# Patient Record
Sex: MALE | Race: BLACK OR AFRICAN AMERICAN | NOT HISPANIC OR LATINO | Employment: FULL TIME | URBAN - METROPOLITAN AREA
[De-identification: names, ages, dates, MRNs, and addresses within clinical notes are randomized per-mention and may not be internally consistent; named-entity substitution may affect disease eponyms.]

---

## 2023-10-19 ENCOUNTER — OFFICE VISIT (OUTPATIENT)
Dept: ORTHOPEDIC SURGERY | Facility: CLINIC | Age: 18
End: 2023-10-19
Payer: COMMERCIAL

## 2023-10-19 VITALS — WEIGHT: 249.3 LBS | TEMPERATURE: 97.3 F | HEIGHT: 73 IN | BODY MASS INDEX: 33.04 KG/M2

## 2023-10-19 DIAGNOSIS — M25.561 RIGHT KNEE PAIN, UNSPECIFIED CHRONICITY: Primary | ICD-10-CM

## 2023-10-19 DIAGNOSIS — M25.551 RIGHT HIP PAIN: ICD-10-CM

## 2023-10-19 DIAGNOSIS — M25.552 LEFT HIP PAIN: ICD-10-CM

## 2023-10-19 PROCEDURE — 99204 OFFICE O/P NEW MOD 45 MIN: CPT | Performed by: ORTHOPAEDIC SURGERY

## 2023-10-19 RX ORDER — IBUPROFEN 600 MG/1
600 TABLET ORAL EVERY 6 HOURS PRN
COMMUNITY

## 2023-10-19 RX ORDER — FEXOFENADINE HCL 60 MG/1
60 TABLET, FILM COATED ORAL DAILY
COMMUNITY

## 2023-10-19 RX ORDER — TOPIRAMATE 25 MG/1
25 TABLET ORAL 2 TIMES DAILY
COMMUNITY

## 2023-10-19 RX ORDER — FLUOXETINE HYDROCHLORIDE 20 MG/1
40 CAPSULE ORAL DAILY
COMMUNITY

## 2023-10-19 RX ORDER — MONTELUKAST SODIUM 5 MG/1
5 TABLET, CHEWABLE ORAL NIGHTLY
COMMUNITY

## 2023-10-19 NOTE — PROGRESS NOTES
Patient: Edwin Resendez  YOB: 2005 18 y.o. male  Medical Record Number: [id number]    Chief Complaints:   Chief Complaint   Patient presents with    Right Knee - Initial Evaluation    Right Hip - Initial Evaluation       History of Present Illness:Edwin Resendez is a 18 y.o. male who presents with complaints of left hip pain it is intermittent and he has episodes where he feels his leg will catch and give out.  There are times where he will go down onto his hands and knees as the leg gives out.  This has been ongoing for about 6 months and he had episodes every few weeks and his last episode was about a month ago.  He had no previous hip problems however he was diagnosed with hip dysplasia on the right hip as a child and was treated with serial casting.  Also having mild soreness which will occasionally radiate to his knees    Allergies:   Allergies   Allergen Reactions    Amoxicillin Hives    Lactose Intolerance (Gi) Other (See Comments)          Magnesium Citrate Hives       Medications:   Current Outpatient Medications   Medication Sig Dispense Refill    fexofenadine (ALLEGRA) 60 MG tablet Take 1 tablet by mouth Daily.      FLUoxetine (PROzac) 20 MG capsule Take 2 capsules by mouth Daily.      ibuprofen (ADVIL,MOTRIN) 600 MG tablet Take 1 tablet by mouth Every 6 (Six) Hours As Needed for Mild Pain.      montelukast (SINGULAIR) 5 MG chewable tablet Chew 1 tablet Every Night.      topiramate (TOPAMAX) 25 MG tablet Take 1 tablet by mouth 2 (Two) Times a Day.      Ubrogepant (UBRELVY PO) Take  by mouth.       No current facility-administered medications for this visit.         The following portions of the patient's history were reviewed and updated as appropriate: allergies, current medications, past family history, past medical history, past social history, past surgical history and problem list.    Review of Systems:   Pertinent positives/negative listed in HPI above    Physical  "Exam:   Vitals:    10/19/23 1554   Temp: 97.3 °F (36.3 °C)   Weight: 113 kg (249 lb 4.8 oz)   Height: 185.4 cm (73\")   PainSc:   6   PainLoc: Hip       General: A and O x 3, ASA, NAD      Left hip is slightly stiffer than the right he has mild apprehension with flexion abduction and internal rotation he can perform a straight leg raise without lag he walks with a nonantalgic gait        Radiology:  Xrays 2views right hip (AP bilateral hips, and lateral of the hip) ordered and reviewed for evaluation of hip pain  demonstrating  minimal arthritic findings of both hips possibly some very mild superior medial joint space narrowing.  Also perhaps some very mild dysplasia.  There are no previous films or comparison    X-rays 3 views of the right knee were ordered and reviewed which show no abnormality.  No previous films or comparison    Assessment/Plan: History of right hip dysplasia mild arthritis.    Left hip pain with mechanical symptoms and giving way concerned he could have a labral tear I am going to get an MRI arthrogram of the hip and I will call him with results      Diagnoses and all orders for this visit:    1. Right knee pain, unspecified chronicity (Primary)  -     XR Knee 3 View Right    2. Right hip pain  -     XR Hip With or Without Pelvis 2 - 3 View Right        Gurvinder Pelayo MD  10/19/2023  "

## 2023-11-01 ENCOUNTER — HOSPITAL ENCOUNTER (OUTPATIENT)
Dept: MRI IMAGING | Facility: HOSPITAL | Age: 18
Discharge: HOME | End: 2023-11-01
Payer: COMMERCIAL

## 2023-11-01 ENCOUNTER — HOSPITAL ENCOUNTER (OUTPATIENT)
Dept: GENERAL RADIOLOGY | Facility: HOSPITAL | Age: 18
Discharge: HOME | End: 2023-11-01
Payer: COMMERCIAL

## 2023-11-01 DIAGNOSIS — M25.552 LEFT HIP PAIN: ICD-10-CM

## 2023-11-01 PROCEDURE — 73722 MRI JOINT OF LWR EXTR W/DYE: CPT

## 2023-11-01 PROCEDURE — 25510000001 IOPAMIDOL 61 % SOLUTION: Performed by: ORTHOPAEDIC SURGERY

## 2023-11-01 PROCEDURE — 0 GADOBENATE DIMEGLUMINE 529 MG/ML SOLUTION: Performed by: ORTHOPAEDIC SURGERY

## 2023-11-01 PROCEDURE — A9577 INJ MULTIHANCE: HCPCS | Performed by: ORTHOPAEDIC SURGERY

## 2023-11-01 PROCEDURE — 25010000002 LIDOCAINE 1 % SOLUTION: Performed by: ORTHOPAEDIC SURGERY

## 2023-11-01 PROCEDURE — 77002 NEEDLE LOCALIZATION BY XRAY: CPT

## 2023-11-01 RX ORDER — LIDOCAINE HYDROCHLORIDE 10 MG/ML
10 INJECTION, SOLUTION INFILTRATION; PERINEURAL
Status: COMPLETED | OUTPATIENT
Start: 2023-11-01 | End: 2023-11-01

## 2023-11-01 RX ADMIN — GADOBENATE DIMEGLUMINE 0.01 ML: 529 INJECTION, SOLUTION INTRAVENOUS at 14:17

## 2023-11-01 RX ADMIN — IOPAMIDOL 4 ML: 612 INJECTION, SOLUTION INTRAVENOUS at 13:59

## 2023-11-01 RX ADMIN — LIDOCAINE HYDROCHLORIDE 4 ML: 10 INJECTION, SOLUTION INFILTRATION; PERINEURAL at 13:59

## 2023-11-02 NOTE — PROGRESS NOTES
I called and spoke to this patient in regards to his MRI results.  I have reviewed these results and discussed treatment recommendations with Dr. Pelayo.  Patient's MRI shows no obvious abnormality noted.  Dr. Pelayo feels like the patient could benefit from some outpatient physical therapy.  Patient states that he would like to see if more time would make his symptoms improve and then if not he will call back and schedule therapy at that time.  For he can follow back up with us on an as-needed basis.

## 2024-01-23 ENCOUNTER — OFFICE VISIT (OUTPATIENT)
Dept: ORTHOPEDIC SURGERY | Facility: CLINIC | Age: 19
End: 2024-01-23
Payer: COMMERCIAL

## 2024-01-23 VITALS — BODY MASS INDEX: 34.96 KG/M2 | TEMPERATURE: 98.7 F | HEIGHT: 73 IN | WEIGHT: 263.8 LBS

## 2024-01-23 DIAGNOSIS — R52 PAIN: Primary | ICD-10-CM

## 2024-01-23 DIAGNOSIS — M25.562 ACUTE PAIN OF LEFT KNEE: ICD-10-CM

## 2024-01-23 PROCEDURE — 73562 X-RAY EXAM OF KNEE 3: CPT | Performed by: NURSE PRACTITIONER

## 2024-01-23 PROCEDURE — 99214 OFFICE O/P EST MOD 30 MIN: CPT | Performed by: NURSE PRACTITIONER

## 2024-01-23 PROCEDURE — 1160F RVW MEDS BY RX/DR IN RCRD: CPT | Performed by: NURSE PRACTITIONER

## 2024-01-23 PROCEDURE — 1159F MED LIST DOCD IN RCRD: CPT | Performed by: NURSE PRACTITIONER

## 2024-01-23 NOTE — PROGRESS NOTES
"Patient: Edwin Resendez  YOB: 2005 18 y.o. male  Medical Record Number: [id number]    Chief Complaints:   Chief Complaint   Patient presents with    Left Knee - Initial Evaluation, Pain       History of Present Illness:Edwin Resendez is a 18 y.o. male who presents as a new patient both myself as well as to the practice with complaints of left knee pain.  Patient reports that it started 1 month ago, acute onset, he has had multiple episodes of the knee giving out and feeling unstable.  Patient has had to use a knee sleeve at work because he is so worried that he might fall.  He reports that it feels as though there is\" a pressure\" within the knee and the pain is severe at times    Allergies:   Allergies   Allergen Reactions    Amoxicillin Hives    Lactose Intolerance (Gi) Other (See Comments)          Magnesium Citrate Hives       Medications:   Current Outpatient Medications   Medication Sig Dispense Refill    fexofenadine (ALLEGRA) 60 MG tablet Take 1 tablet by mouth Daily.      FLUoxetine (PROzac) 20 MG capsule Take 2 capsules by mouth Daily.      ibuprofen (ADVIL,MOTRIN) 600 MG tablet Take 1 tablet by mouth Every 6 (Six) Hours As Needed for Mild Pain.      montelukast (SINGULAIR) 5 MG chewable tablet Chew 1 tablet Every Night.      Ubrogepant (UBRELVY PO) Take  by mouth.      topiramate (TOPAMAX) 25 MG tablet Take 1 tablet by mouth 2 (Two) Times a Day. (Patient not taking: Reported on 1/23/2024)       No current facility-administered medications for this visit.         The following portions of the patient's history were reviewed and updated as appropriate: allergies, current medications, past family history, past medical history, past social history, past surgical history and problem list.    Review of Systems:   14 point review of systems was performed. All systems negative except pertinent positives/negatives listed in HPI above    Physical Exam:   Vitals:    01/23/24 1515 " "  Temp: 98.7 °F (37.1 °C)   TempSrc: Temporal   Weight: 120 kg (263 lb 12.8 oz)   Height: 185.4 cm (73\")   PainSc:   3   PainLoc: Knee       General: A and O x 3, ASA, NAD   Skin clear no unusual lesions noted  Left knee patient has trace amount of effusion noted with 116 degrees flexion neutral in extension with a positive Bassem negative Lockman calf soft and nontender       Radiology:  Xrays 3views (ap,lateral, sunrise) 3 views of left knee were ordered and reviewed today secondary to pain and were normal no comparative views available    Assessment/Plan: Severe left knee pain with mechanical symptoms    Patient and I discussed options, he has been taking ibuprofen for the last several weeks and that has not helped, he has also tried activity modification yet his symptoms persist, obvious concern would be a meniscus tear, recommending an MRI of the left knee to further evaluate and once we get the results back we will contact patient regarding treatment options      Darya Rick, APRN  1/23/2024  "

## 2024-02-23 ENCOUNTER — HOSPITAL ENCOUNTER (OUTPATIENT)
Dept: MRI IMAGING | Facility: HOSPITAL | Age: 19
Discharge: HOME | End: 2024-02-23
Payer: COMMERCIAL

## 2024-02-23 DIAGNOSIS — M25.562 ACUTE PAIN OF LEFT KNEE: ICD-10-CM

## 2024-02-23 PROCEDURE — 73721 MRI JNT OF LWR EXTRE W/O DYE: CPT

## 2024-02-26 ENCOUNTER — TELEPHONE (OUTPATIENT)
Dept: ORTHOPEDIC SURGERY | Facility: CLINIC | Age: 19
End: 2024-02-26
Payer: COMMERCIAL

## 2024-02-26 DIAGNOSIS — M25.562 CHRONIC PAIN OF LEFT KNEE: Primary | ICD-10-CM

## 2024-02-26 DIAGNOSIS — G89.29 CHRONIC PAIN OF LEFT KNEE: Primary | ICD-10-CM

## 2024-02-26 NOTE — TELEPHONE ENCOUNTER
----- Message from SAMIRA Mariee sent at 2/26/2024 10:37 AM EST -----  Please let patient know that the MRI of his left knee shows some chronic patellar tendinitis, no tears.  Would recommend physical therapy and scheduled over-the-counter anti-inflammatory.

## 2024-02-26 NOTE — TELEPHONE ENCOUNTER
Spoke with patient, relayed to him results of left knee MRI as well as plan of care. Patient verbalized understanding of results and is agreeable to plan. Orders placed for physical therapy.

## 2024-03-11 ENCOUNTER — OFFICE VISIT (OUTPATIENT)
Dept: SURGERY | Facility: CLINIC | Age: 19
End: 2024-03-11
Payer: COMMERCIAL

## 2024-03-11 ENCOUNTER — PREP FOR SURGERY (OUTPATIENT)
Dept: OTHER | Facility: HOSPITAL | Age: 19
End: 2024-03-11
Payer: COMMERCIAL

## 2024-03-11 VITALS
HEIGHT: 73 IN | SYSTOLIC BLOOD PRESSURE: 122 MMHG | BODY MASS INDEX: 34.85 KG/M2 | WEIGHT: 263 LBS | DIASTOLIC BLOOD PRESSURE: 78 MMHG

## 2024-03-11 DIAGNOSIS — L05.91 PILONIDAL CYST: Primary | ICD-10-CM

## 2024-03-11 DIAGNOSIS — L05.01 PILONIDAL CYST WITH ABSCESS: Primary | ICD-10-CM

## 2024-03-11 RX ORDER — SODIUM CHLORIDE 9 MG/ML
40 INJECTION, SOLUTION INTRAVENOUS AS NEEDED
OUTPATIENT
Start: 2024-05-09

## 2024-03-11 RX ORDER — METRONIDAZOLE 500 MG/100ML
500 INJECTION, SOLUTION INTRAVENOUS ONCE
OUTPATIENT
Start: 2024-05-09 | End: 2024-03-11

## 2024-03-11 RX ORDER — SULFAMETHOXAZOLE AND TRIMETHOPRIM 800; 160 MG/1; MG/1
1 TABLET ORAL 2 TIMES DAILY
Qty: 14 TABLET | Refills: 0 | Status: SHIPPED | OUTPATIENT
Start: 2024-03-11

## 2024-03-11 RX ORDER — SODIUM CHLORIDE 0.9 % (FLUSH) 0.9 %
10 SYRINGE (ML) INJECTION EVERY 12 HOURS SCHEDULED
OUTPATIENT
Start: 2024-05-09

## 2024-03-11 RX ORDER — SODIUM CHLORIDE 0.9 % (FLUSH) 0.9 %
10 SYRINGE (ML) INJECTION AS NEEDED
OUTPATIENT
Start: 2024-05-09

## 2024-03-11 NOTE — H&P (VIEW-ONLY)
General Surgery Initial Office Visit    Referring Provider: Dora Walker MD    Chief Complaint:    Pilonidal cyst    History of Present Illness:    Edwin Ingram is a 18 y.o. male with history of a pilonidal cyst.  He states it became swollen and infected in February.  He was seen at [facility] where it was incised and drained.  It improved after that, but recently it has become more swollen and feels like it is infected again.    His mother reports she also had a pilonidal cyst that required excision and packing.  His sister also has a pilonidal cyst.     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  Denies tobacco use  Denies alcohol use    Allergies:   Allergies   Allergen Reactions    Amoxicillin Hives    Lactose Intolerance (Gi) Other (See Comments)          Magnesium Citrate Hives       Medications:     Current Outpatient Medications:     fexofenadine (ALLEGRA) 60 MG tablet, Take 1 tablet by mouth Daily., Disp: , Rfl:     FLUoxetine (PROzac) 20 MG capsule, Take 2 capsules by mouth Daily., Disp: , Rfl:     ibuprofen (ADVIL,MOTRIN) 600 MG tablet, Take 1 tablet by mouth Every 6 (Six) Hours As Needed for Mild Pain., Disp: , Rfl:     montelukast (SINGULAIR) 5 MG chewable tablet, Chew 1 tablet Every Night., Disp: , Rfl:     topiramate (TOPAMAX) 25 MG tablet, Take 1 tablet by mouth 2 (Two) Times a Day., Disp: , Rfl:     Ubrogepant (UBRELVY PO), Take  by mouth., Disp: , Rfl:     sulfamethoxazole-trimethoprim (Bactrim DS) 800-160 MG per tablet, Take 1 tablet by mouth 2 (Two) Times a Day., Disp: 14 tablet, Rfl: 0    Radiology/Endoscopy:    No pertinent imaging    Labs:     No recent labs    Review of Systems:   Influenza-like illness: no fever, no  cough, no  sore throat, no  body aches, no loss of sense of taste or smell, no known exposure to person with Covid-19.  Constitutional: Negative for fevers or chills  HENT: Negative for hearing loss or runny nose  Eyes: Negative for vision changes or scleral icterus  Respiratory: Negative for cough or shortness of breath  Cardiovascular: Negative for chest pain or heart palpitations  Gastrointestinal:  Negative for abdominal pain, nausea, vomiting, constipation, melena, or hematochezia  Genitourinary: Negative for hematuria or dysuria  Musculoskeletal: Negative for joint swelling or gait instability  Neurologic: Negative for tremors or seizures  Psychiatric: Negative for suicidal ideations or depression  All other systems reviewed and negative    Physical Exam:   Body mass index is 34.7 kg/m².  Constitutional: Well-developed well-nourished, no acute distress  Eyes: Conjunctiva normal, sclera nonicteric  ENMT: Hearing grossly normal, oral mucosa moist  Neck: Supple, trachea midline  Respiratory: No increased work of breathing, normal inspiratory effort  Cardiovascular: Regular rate, no peripheral edema, no jugular venous distention  Gastrointestinal: Soft, nontender, wound superior to the gluteal cleft there is an area of fluctuance that is tender to palpation consistent with infected pilonidal cyst  Skin:  Warm, dry, no rash on visualized skin surfaces  Musculoskeletal: Symmetric strength, normal gait  Psychiatric: Alert and oriented ×3, normal affect     Procedures:  Incision & Drainage infected Pilonidal cyst    Date/Time: 3/11/2024 3:01 PM    Performed by: Shoshana Mendieta MD  Authorized by: Shoshana Mendeita MD  Consent: Verbal consent obtained.  Risks and benefits: risks, benefits and alternatives were discussed  Consent given by: patient  Patient understanding: patient states understanding of the procedure being performed  Patient  "identity confirmed: verbally with patient  Time out: Immediately prior to procedure a \"time out\" was called to verify the correct patient, procedure, equipment, support staff and site/side marked as required.  Type: pilonidal cyst  Anesthesia: local infiltration    Anesthesia:  Local Anesthetic: lidocaine 1% with epinephrine  Anesthetic total: 10 mL  Scalpel size: 11  Incision type: elliptical  Drainage: purulent  Wound treatment: wound left open  Packing material: 1/2 in iodoform gauze  Patient tolerance: patient tolerated the procedure well with no immediate complications        Assessment/Plan:  Pilonidal cyst with abscess    Patient consented to I&D today. See procedure note above. Instructed patient and his mother on packing the wound.    I have also sent an additional prescription for Bactrim DS to help clear the infection.    I offered the patient a surgical excision of the pilonidal cyst.  I explained the procedure in detail to the patient and his mother.  I explained that if there was no sign of infection at the time of surgery I would close the skin primarily, but there is still a significant risk of the skin opening which would require wet-to-dry dressings until it heals.  If there is any evidence of ongoing infection at the time of surgery, I will just leave it open to heal by secondary intention with wet-to-dry dressings.  I explained that wound VAC is typically reserved for wounds that failed to heal with wet-to-dry dressings. All risks (including bleeding, infection, damage to surrounding structures, recurrence of pilonidal cyst), benefits, and alternatives were explained to the patient who agreed and wished to proceed.      ZACH NEWTON M.D.  General, Robotic, and Endoscopic Surgery  [facility] [address], [address]  [place], [place], [address]  P: [phone]  F: [phone]     "

## 2024-03-11 NOTE — PROGRESS NOTES
General Surgery Initial Office Visit    Referring Provider: Dora Walker MD    Chief Complaint:    Pilonidal cyst    History of Present Illness:    Edwin Ingram is a 18 y.o. male with history of a pilonidal cyst.  He states it became swollen and infected in February.  He was seen at [facility] where it was incised and drained.  It improved after that, but recently it has become more swollen and feels like it is infected again.    His mother reports she also had a pilonidal cyst that required excision and packing.  His sister also has a pilonidal cyst.     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  Denies tobacco use  Denies alcohol use    Allergies:   Allergies   Allergen Reactions    Amoxicillin Hives    Lactose Intolerance (Gi) Other (See Comments)          Magnesium Citrate Hives       Medications:     Current Outpatient Medications:     fexofenadine (ALLEGRA) 60 MG tablet, Take 1 tablet by mouth Daily., Disp: , Rfl:     FLUoxetine (PROzac) 20 MG capsule, Take 2 capsules by mouth Daily., Disp: , Rfl:     ibuprofen (ADVIL,MOTRIN) 600 MG tablet, Take 1 tablet by mouth Every 6 (Six) Hours As Needed for Mild Pain., Disp: , Rfl:     montelukast (SINGULAIR) 5 MG chewable tablet, Chew 1 tablet Every Night., Disp: , Rfl:     topiramate (TOPAMAX) 25 MG tablet, Take 1 tablet by mouth 2 (Two) Times a Day., Disp: , Rfl:     Ubrogepant (UBRELVY PO), Take  by mouth., Disp: , Rfl:     sulfamethoxazole-trimethoprim (Bactrim DS) 800-160 MG per tablet, Take 1 tablet by mouth 2 (Two) Times a Day., Disp: 14 tablet, Rfl: 0    Radiology/Endoscopy:    No pertinent imaging    Labs:     No recent labs    Review of Systems:   Influenza-like illness: no fever, no  cough, no  sore throat, no  body aches, no loss of sense of taste or smell, no known exposure to person with Covid-19.  Constitutional: Negative for fevers or chills  HENT: Negative for hearing loss or runny nose  Eyes: Negative for vision changes or scleral icterus  Respiratory: Negative for cough or shortness of breath  Cardiovascular: Negative for chest pain or heart palpitations  Gastrointestinal:  Negative for abdominal pain, nausea, vomiting, constipation, melena, or hematochezia  Genitourinary: Negative for hematuria or dysuria  Musculoskeletal: Negative for joint swelling or gait instability  Neurologic: Negative for tremors or seizures  Psychiatric: Negative for suicidal ideations or depression  All other systems reviewed and negative    Physical Exam:   Body mass index is 34.7 kg/m².  Constitutional: Well-developed well-nourished, no acute distress  Eyes: Conjunctiva normal, sclera nonicteric  ENMT: Hearing grossly normal, oral mucosa moist  Neck: Supple, trachea midline  Respiratory: No increased work of breathing, normal inspiratory effort  Cardiovascular: Regular rate, no peripheral edema, no jugular venous distention  Gastrointestinal: Soft, nontender, wound superior to the gluteal cleft there is an area of fluctuance that is tender to palpation consistent with infected pilonidal cyst  Skin:  Warm, dry, no rash on visualized skin surfaces  Musculoskeletal: Symmetric strength, normal gait  Psychiatric: Alert and oriented ×3, normal affect     Procedures:  Incision & Drainage infected Pilonidal cyst    Date/Time: 3/11/2024 3:01 PM    Performed by: Shoshana Mendieta MD  Authorized by: Shoshana Mendieta MD  Consent: Verbal consent obtained.  Risks and benefits: risks, benefits and alternatives were discussed  Consent given by: patient  Patient understanding: patient states understanding of the procedure being performed  Patient  "identity confirmed: verbally with patient  Time out: Immediately prior to procedure a \"time out\" was called to verify the correct patient, procedure, equipment, support staff and site/side marked as required.  Type: pilonidal cyst  Anesthesia: local infiltration    Anesthesia:  Local Anesthetic: lidocaine 1% with epinephrine  Anesthetic total: 10 mL  Scalpel size: 11  Incision type: elliptical  Drainage: purulent  Wound treatment: wound left open  Packing material: 1/2 in iodoform gauze  Patient tolerance: patient tolerated the procedure well with no immediate complications        Assessment/Plan:  Pilonidal cyst with abscess    Patient consented to I&D today. See procedure note above. Instructed patient and his mother on packing the wound.    I have also sent an additional prescription for Bactrim DS to help clear the infection.    I offered the patient a surgical excision of the pilonidal cyst.  I explained the procedure in detail to the patient and his mother.  I explained that if there was no sign of infection at the time of surgery I would close the skin primarily, but there is still a significant risk of the skin opening which would require wet-to-dry dressings until it heals.  If there is any evidence of ongoing infection at the time of surgery, I will just leave it open to heal by secondary intention with wet-to-dry dressings.  I explained that wound VAC is typically reserved for wounds that failed to heal with wet-to-dry dressings. All risks (including bleeding, infection, damage to surrounding structures, recurrence of pilonidal cyst), benefits, and alternatives were explained to the patient who agreed and wished to proceed.      ZACH NEWTON M.D.  General, Robotic, and Endoscopic Surgery  [facility] [address], [address]  [place], [place], [address]  P: [phone]  F: [phone]     "

## 2024-03-12 PROBLEM — L05.91 PILONIDAL CYST: Status: ACTIVE | Noted: 2024-03-11

## 2024-03-25 ENCOUNTER — TELEPHONE (OUTPATIENT)
Dept: SURGERY | Facility: CLINIC | Age: 19
End: 2024-03-25

## 2024-03-25 NOTE — TELEPHONE ENCOUNTER
Caller: JACKY OLIVER    Relationship: MOTHER    Best call back number: [phone]     What is the best time to reach you: TODAY OR MORNINGS    Who are you requesting to speak with (clinical staff, provider,  specific staff member): [facility]    Do you know the name of the person who called:     What was the call regarding: PT'S CYST IS STARTING TO ACT UP AGAIN AND THEY ARE WANTING TO SEE IF SURGERY CAN BE MOVED UP, POSSIBLY    Is it okay if the provider responds through mySupermarkethart: NO, PLEASE CALL - OKAY TO LEAVE DETAILED VM

## 2024-03-26 ENCOUNTER — TELEPHONE (OUTPATIENT)
Dept: SURGERY | Facility: CLINIC | Age: 19
End: 2024-03-26
Payer: COMMERCIAL

## 2024-03-26 RX ORDER — SULFAMETHOXAZOLE AND TRIMETHOPRIM 800; 160 MG/1; MG/1
1 TABLET ORAL 2 TIMES DAILY
Qty: 14 TABLET | Refills: 0 | Status: SHIPPED | OUTPATIENT
Start: 2024-03-26 | End: 2024-04-02

## 2024-03-26 NOTE — TELEPHONE ENCOUNTER
Dr. Mendieta patient.  She stated that there is drainage with an odor, area is tender and hard. Patient has pilonidal cystectomy surgery scheduled 4/4.  Previous antibiotics include Cephalexin & Bactrim. Previous I&D 2/20/24 @ [facility] & by Dr. Mendieta on 3/11.    Patient's mother sent the below message & pictures.  The incision has closed but the 1st photo is from when it was open. It’s hard and tender to touch

## 2024-04-04 ENCOUNTER — HOSPITAL ENCOUNTER (OUTPATIENT)
Facility: HOSPITAL | Age: 19
Setting detail: HOSPITAL OUTPATIENT SURGERY
Discharge: HOME | End: 2024-04-04
Attending: SURGERY | Admitting: SURGERY
Payer: COMMERCIAL

## 2024-04-04 ENCOUNTER — ANESTHESIA (OUTPATIENT)
Dept: PERIOP | Facility: HOSPITAL | Age: 19
End: 2024-04-04
Payer: COMMERCIAL

## 2024-04-04 ENCOUNTER — ANESTHESIA EVENT (OUTPATIENT)
Dept: PERIOP | Facility: HOSPITAL | Age: 19
End: 2024-04-04
Payer: COMMERCIAL

## 2024-04-04 VITALS
HEART RATE: 74 BPM | DIASTOLIC BLOOD PRESSURE: 70 MMHG | SYSTOLIC BLOOD PRESSURE: 108 MMHG | TEMPERATURE: 97.5 F | RESPIRATION RATE: 16 BRPM | OXYGEN SATURATION: 96 %

## 2024-04-04 DIAGNOSIS — L05.01 PILONIDAL CYST WITH ABSCESS: Primary | ICD-10-CM

## 2024-04-04 DIAGNOSIS — L05.91 PILONIDAL CYST: ICD-10-CM

## 2024-04-04 PROCEDURE — 25010000002 PROPOFOL 200 MG/20ML EMULSION: Performed by: NURSE ANESTHETIST, CERTIFIED REGISTERED

## 2024-04-04 PROCEDURE — 25010000002 ONDANSETRON PER 1 MG: Performed by: NURSE ANESTHETIST, CERTIFIED REGISTERED

## 2024-04-04 PROCEDURE — 88304 TISSUE EXAM BY PATHOLOGIST: CPT | Performed by: SURGERY

## 2024-04-04 PROCEDURE — 25010000002 CEFAZOLIN PER 500 MG: Performed by: SURGERY

## 2024-04-04 PROCEDURE — 25010000002 KETOROLAC TROMETHAMINE PER 15 MG: Performed by: NURSE ANESTHETIST, CERTIFIED REGISTERED

## 2024-04-04 PROCEDURE — 25010000002 METRONIDAZOLE 500 MG/100ML SOLUTION: Performed by: SURGERY

## 2024-04-04 PROCEDURE — 25010000002 HYDROMORPHONE 1 MG/ML SOLUTION: Performed by: NURSE ANESTHETIST, CERTIFIED REGISTERED

## 2024-04-04 PROCEDURE — 25810000003 LACTATED RINGERS PER 1000 ML: Performed by: STUDENT IN AN ORGANIZED HEALTH CARE EDUCATION/TRAINING PROGRAM

## 2024-04-04 PROCEDURE — 11771 EXC PILONIDAL CYST XTNSV: CPT | Performed by: SURGERY

## 2024-04-04 PROCEDURE — 25010000002 FENTANYL CITRATE (PF) 50 MCG/ML SOLUTION: Performed by: NURSE ANESTHETIST, CERTIFIED REGISTERED

## 2024-04-04 PROCEDURE — 25010000002 SUGAMMADEX 200 MG/2ML SOLUTION: Performed by: NURSE ANESTHETIST, CERTIFIED REGISTERED

## 2024-04-04 RX ORDER — PROMETHAZINE HYDROCHLORIDE 25 MG/1
25 TABLET ORAL ONCE AS NEEDED
Status: DISCONTINUED | OUTPATIENT
Start: 2024-04-04 | End: 2024-04-04 | Stop reason: HOSPADM

## 2024-04-04 RX ORDER — BUPIVACAINE HYDROCHLORIDE AND EPINEPHRINE 5; 5 MG/ML; UG/ML
INJECTION, SOLUTION EPIDURAL; INTRACAUDAL; PERINEURAL AS NEEDED
Status: DISCONTINUED | OUTPATIENT
Start: 2024-04-04 | End: 2024-04-04 | Stop reason: HOSPADM

## 2024-04-04 RX ORDER — HYDROCODONE BITARTRATE AND ACETAMINOPHEN 5; 325 MG/1; MG/1
1 TABLET ORAL ONCE AS NEEDED
Status: COMPLETED | OUTPATIENT
Start: 2024-04-04 | End: 2024-04-04

## 2024-04-04 RX ORDER — NALOXONE HCL 0.4 MG/ML
0.2 VIAL (ML) INJECTION AS NEEDED
Status: DISCONTINUED | OUTPATIENT
Start: 2024-04-04 | End: 2024-04-04 | Stop reason: HOSPADM

## 2024-04-04 RX ORDER — FENTANYL CITRATE 50 UG/ML
50 INJECTION, SOLUTION INTRAMUSCULAR; INTRAVENOUS
Status: DISCONTINUED | OUTPATIENT
Start: 2024-04-04 | End: 2024-04-04 | Stop reason: HOSPADM

## 2024-04-04 RX ORDER — FENTANYL CITRATE 50 UG/ML
INJECTION, SOLUTION INTRAMUSCULAR; INTRAVENOUS AS NEEDED
Status: DISCONTINUED | OUTPATIENT
Start: 2024-04-04 | End: 2024-04-04 | Stop reason: SURG

## 2024-04-04 RX ORDER — HYDROMORPHONE HYDROCHLORIDE 1 MG/ML
0.5 INJECTION, SOLUTION INTRAMUSCULAR; INTRAVENOUS; SUBCUTANEOUS
Status: DISCONTINUED | OUTPATIENT
Start: 2024-04-04 | End: 2024-04-04 | Stop reason: HOSPADM

## 2024-04-04 RX ORDER — SODIUM CHLORIDE 0.9 % (FLUSH) 0.9 %
10 SYRINGE (ML) INJECTION EVERY 12 HOURS SCHEDULED
Status: DISCONTINUED | OUTPATIENT
Start: 2024-04-04 | End: 2024-04-04 | Stop reason: HOSPADM

## 2024-04-04 RX ORDER — PROPOFOL 10 MG/ML
INJECTION, EMULSION INTRAVENOUS AS NEEDED
Status: DISCONTINUED | OUTPATIENT
Start: 2024-04-04 | End: 2024-04-04 | Stop reason: SURG

## 2024-04-04 RX ORDER — IPRATROPIUM BROMIDE AND ALBUTEROL SULFATE 2.5; .5 MG/3ML; MG/3ML
3 SOLUTION RESPIRATORY (INHALATION) ONCE AS NEEDED
Status: DISCONTINUED | OUTPATIENT
Start: 2024-04-04 | End: 2024-04-04 | Stop reason: HOSPADM

## 2024-04-04 RX ORDER — SODIUM CHLORIDE 0.9 % (FLUSH) 0.9 %
10 SYRINGE (ML) INJECTION AS NEEDED
Status: DISCONTINUED | OUTPATIENT
Start: 2024-04-04 | End: 2024-04-04 | Stop reason: HOSPADM

## 2024-04-04 RX ORDER — PROMETHAZINE HYDROCHLORIDE 25 MG/1
25 SUPPOSITORY RECTAL ONCE AS NEEDED
Status: DISCONTINUED | OUTPATIENT
Start: 2024-04-04 | End: 2024-04-04 | Stop reason: HOSPADM

## 2024-04-04 RX ORDER — HYDRALAZINE HYDROCHLORIDE 20 MG/ML
5 INJECTION INTRAMUSCULAR; INTRAVENOUS
Status: DISCONTINUED | OUTPATIENT
Start: 2024-04-04 | End: 2024-04-04 | Stop reason: HOSPADM

## 2024-04-04 RX ORDER — LABETALOL HYDROCHLORIDE 5 MG/ML
5 INJECTION, SOLUTION INTRAVENOUS
Status: DISCONTINUED | OUTPATIENT
Start: 2024-04-04 | End: 2024-04-04 | Stop reason: HOSPADM

## 2024-04-04 RX ORDER — HYDROCODONE BITARTRATE AND ACETAMINOPHEN 5; 325 MG/1; MG/1
1 TABLET ORAL EVERY 6 HOURS PRN
Qty: 20 TABLET | Refills: 0 | Status: SHIPPED | OUTPATIENT
Start: 2024-04-04

## 2024-04-04 RX ORDER — METRONIDAZOLE 500 MG/100ML
500 INJECTION, SOLUTION INTRAVENOUS ONCE
Status: COMPLETED | OUTPATIENT
Start: 2024-04-04 | End: 2024-04-04

## 2024-04-04 RX ORDER — ONDANSETRON 2 MG/ML
4 INJECTION INTRAMUSCULAR; INTRAVENOUS ONCE AS NEEDED
Status: DISCONTINUED | OUTPATIENT
Start: 2024-04-04 | End: 2024-04-04 | Stop reason: HOSPADM

## 2024-04-04 RX ORDER — LIDOCAINE HYDROCHLORIDE 20 MG/ML
INJECTION, SOLUTION INFILTRATION; PERINEURAL AS NEEDED
Status: DISCONTINUED | OUTPATIENT
Start: 2024-04-04 | End: 2024-04-04 | Stop reason: SURG

## 2024-04-04 RX ORDER — FENTANYL CITRATE 50 UG/ML
25 INJECTION, SOLUTION INTRAMUSCULAR; INTRAVENOUS
Status: DISCONTINUED | OUTPATIENT
Start: 2024-04-04 | End: 2024-04-04 | Stop reason: HOSPADM

## 2024-04-04 RX ORDER — EPHEDRINE SULFATE 50 MG/ML
5 INJECTION, SOLUTION INTRAVENOUS ONCE AS NEEDED
Status: DISCONTINUED | OUTPATIENT
Start: 2024-04-04 | End: 2024-04-04 | Stop reason: HOSPADM

## 2024-04-04 RX ORDER — OXYCODONE AND ACETAMINOPHEN 7.5; 325 MG/1; MG/1
1 TABLET ORAL EVERY 4 HOURS PRN
Status: DISCONTINUED | OUTPATIENT
Start: 2024-04-04 | End: 2024-04-04 | Stop reason: HOSPADM

## 2024-04-04 RX ORDER — SODIUM CHLORIDE 9 MG/ML
40 INJECTION, SOLUTION INTRAVENOUS AS NEEDED
Status: DISCONTINUED | OUTPATIENT
Start: 2024-04-04 | End: 2024-04-04 | Stop reason: HOSPADM

## 2024-04-04 RX ORDER — DROPERIDOL 2.5 MG/ML
0.62 INJECTION, SOLUTION INTRAMUSCULAR; INTRAVENOUS
Status: DISCONTINUED | OUTPATIENT
Start: 2024-04-04 | End: 2024-04-04 | Stop reason: HOSPADM

## 2024-04-04 RX ORDER — FLUMAZENIL 0.1 MG/ML
0.2 INJECTION INTRAVENOUS AS NEEDED
Status: DISCONTINUED | OUTPATIENT
Start: 2024-04-04 | End: 2024-04-04 | Stop reason: HOSPADM

## 2024-04-04 RX ORDER — MAGNESIUM HYDROXIDE 1200 MG/15ML
LIQUID ORAL AS NEEDED
Status: DISCONTINUED | OUTPATIENT
Start: 2024-04-04 | End: 2024-04-04 | Stop reason: HOSPADM

## 2024-04-04 RX ORDER — DIPHENHYDRAMINE HYDROCHLORIDE 50 MG/ML
12.5 INJECTION INTRAMUSCULAR; INTRAVENOUS
Status: DISCONTINUED | OUTPATIENT
Start: 2024-04-04 | End: 2024-04-04 | Stop reason: HOSPADM

## 2024-04-04 RX ORDER — ROCURONIUM BROMIDE 10 MG/ML
INJECTION, SOLUTION INTRAVENOUS AS NEEDED
Status: DISCONTINUED | OUTPATIENT
Start: 2024-04-04 | End: 2024-04-04 | Stop reason: SURG

## 2024-04-04 RX ORDER — KETOROLAC TROMETHAMINE 30 MG/ML
INJECTION, SOLUTION INTRAMUSCULAR; INTRAVENOUS AS NEEDED
Status: DISCONTINUED | OUTPATIENT
Start: 2024-04-04 | End: 2024-04-04 | Stop reason: SURG

## 2024-04-04 RX ORDER — ONDANSETRON 2 MG/ML
INJECTION INTRAMUSCULAR; INTRAVENOUS AS NEEDED
Status: DISCONTINUED | OUTPATIENT
Start: 2024-04-04 | End: 2024-04-04 | Stop reason: SURG

## 2024-04-04 RX ORDER — SODIUM CHLORIDE, SODIUM LACTATE, POTASSIUM CHLORIDE, CALCIUM CHLORIDE 600; 310; 30; 20 MG/100ML; MG/100ML; MG/100ML; MG/100ML
9 INJECTION, SOLUTION INTRAVENOUS CONTINUOUS PRN
Status: DISCONTINUED | OUTPATIENT
Start: 2024-04-04 | End: 2024-04-04 | Stop reason: HOSPADM

## 2024-04-04 RX ORDER — MIDAZOLAM HYDROCHLORIDE 1 MG/ML
1 INJECTION INTRAMUSCULAR; INTRAVENOUS
Status: DISCONTINUED | OUTPATIENT
Start: 2024-04-04 | End: 2024-04-04 | Stop reason: HOSPADM

## 2024-04-04 RX ADMIN — METRONIDAZOLE 500 MG: 500 INJECTION, SOLUTION INTRAVENOUS at 12:26

## 2024-04-04 RX ADMIN — HYDROCODONE BITARTRATE AND ACETAMINOPHEN 1 TABLET: 5; 325 TABLET ORAL at 14:49

## 2024-04-04 RX ADMIN — HYDROMORPHONE HYDROCHLORIDE 0.5 MG: 1 INJECTION, SOLUTION INTRAMUSCULAR; INTRAVENOUS; SUBCUTANEOUS at 13:24

## 2024-04-04 RX ADMIN — ROCURONIUM BROMIDE 50 MG: 10 INJECTION, SOLUTION INTRAVENOUS at 12:41

## 2024-04-04 RX ADMIN — ONDANSETRON 4 MG: 2 INJECTION INTRAMUSCULAR; INTRAVENOUS at 13:24

## 2024-04-04 RX ADMIN — KETOROLAC TROMETHAMINE 30 MG: 30 INJECTION, SOLUTION INTRAMUSCULAR at 13:24

## 2024-04-04 RX ADMIN — PROPOFOL 200 MG: 10 INJECTION, EMULSION INTRAVENOUS at 12:41

## 2024-04-04 RX ADMIN — SODIUM CHLORIDE, POTASSIUM CHLORIDE, SODIUM LACTATE AND CALCIUM CHLORIDE 9 ML/HR: 600; 310; 30; 20 INJECTION, SOLUTION INTRAVENOUS at 12:27

## 2024-04-04 RX ADMIN — LIDOCAINE HYDROCHLORIDE 100 MG: 20 INJECTION, SOLUTION INFILTRATION; PERINEURAL at 12:41

## 2024-04-04 RX ADMIN — FENTANYL CITRATE 50 MCG: 50 INJECTION, SOLUTION INTRAMUSCULAR; INTRAVENOUS at 12:46

## 2024-04-04 RX ADMIN — SODIUM CHLORIDE 2 G: 900 INJECTION INTRAVENOUS at 12:26

## 2024-04-04 RX ADMIN — PROPOFOL 100 MG: 10 INJECTION, EMULSION INTRAVENOUS at 12:42

## 2024-04-04 RX ADMIN — SUGAMMADEX 200 MG: 100 INJECTION, SOLUTION INTRAVENOUS at 13:24

## 2024-04-04 NOTE — ANESTHESIA PROCEDURE NOTES
Airway  Urgency: elective    Date/Time: 4/4/2024 12:43 PM  Airway not difficult    General Information and Staff    Patient location during procedure: OR  CRNA/CAA: Alberto Aaron CRNA    Indications and Patient Condition  Indications for airway management: airway protection    Preoxygenated: yes  Mask difficulty assessment: 1 - vent by mask    Final Airway Details  Final airway type: endotracheal airway      Successful airway: ETT  Cuffed: yes   Successful intubation technique: direct laryngoscopy  Facilitating devices/methods: intubating stylet  Endotracheal tube insertion site: oral  Blade: Locke  Blade size: 2  ETT size (mm): 7.5  Cormack-Lehane Classification: grade I - full view of glottis  Placement verified by: chest auscultation and capnometry   Measured from: teeth  ETT/EBT  to teeth (cm): 22  Number of attempts at approach: 1  Assessment: lips, teeth, and gum same as pre-op and atraumatic intubation

## 2024-04-04 NOTE — INTERVAL H&P NOTE
H&P reviewed. The patient was examined and there are no changes to the H&P.   He states the area has improved and is less painful.

## 2024-04-04 NOTE — DISCHARGE INSTRUCTIONS
Dr. Shoshana Mendieta  [address] [facility]  [place], [place] [address] [phone]      Discharge Instructions for Pilonidal Cystectomy      Go home, rest and take it easy today; however, you should get up and move about several times today to reduce the risk of developing a clot in your legs.      You may experience some dizziness or memory loss from the anesthesia.  This may last for the next 24 hours.  Someone should plan on staying with you for the first 24 hours for your safety.    Do not make any important legal decisions or sign any legal papers for the next 24 hours.      Eat and drink lightly today.  Start off with liquids, jello, soup, crackers or other bland foods at first. Please drink plenty of fluids. You may advance your diet tomorrow as tolerated as long as you do not experience any nausea or vomiting.     You have a drain through your incision that looks like a red rubber band.  This should remain in place until your follow-up visit.  If it falls out before then, it is okay.  Drainage around the band is normal.  Keep the area covered with gauze to prevent drainage onto her clothing.    Some bleeding and drainage are to be expected.  If there is significant bleeding from the incision, hold pressure and call our office immediately.  Drainage should appear like thin red Zaheer-Aid or apple juice.  If it is cloudy, green or foul-smelling please call our office immediately.    You have received a prescription for a narcotic pain medicine, as you will have pain following surgery.   You will not be totally pain free, but your pain medicine should make the pain tolerable.  Please take your pain medicine as prescribed and always take your pills with food to prevent nausea. Your pain may persist for 1-3 weeks. If you are having severe pain that cannot be controlled by the pain medicine, please contact me.      The goal is for your bowel movements to be soft which will help to minimize pain.  The pain medicine  used to keep your comfortable may also cause some constipation so I recommend the following:    Miralax (17 grams)--1 capfull every day starting the day after surgery.    Fiber (Citrucel, Metamucil, or Fiber-con) as directed.    If you are unable to have a bowel movement by 2 days after surgery, try Milk of Magnesia, Magnesium Citrate, or Colace.    No driving for 24 hours and for as long as you are taking your prescription pain medicine.  You may resume your activities gradually.   Avoid activities that cause significant sweating, and if you do sweat, change your clothing/dressing as soon as possible.    You will need to call the office at [phone] to schedule a follow-up appointment in 2 weeks.    Remember to contact me for any of the following:    Fever > 101 degrees  Severe pain that cannot be controlled by taking your pain pills  Severe nausea or vomiting   Significant bleeding > 1/4 cup  Any other questions or concerns

## 2024-04-04 NOTE — ANESTHESIA POSTPROCEDURE EVALUATION
Patient: Edwin Villanueva Ingram    Procedure Summary       Date: 04/04/24 Room / Location: [facility] OR  / [facility] MAIN OR    Anesthesia Start: 1233 Anesthesia Stop: 1352    Procedure: PILONIDAL CYST Excision (Back) Diagnosis:       Pilonidal cyst      (Pilonidal cyst [L05.91])    Surgeons: Shoshana Mendieta MD Provider: Amparo Vaughn MD    Anesthesia Type: general ASA Status: 2            Anesthesia Type: general    Vitals  Vitals Value Taken Time   /71 04/04/24 1445   Temp 36.4 °C (97.5 °F) 04/04/24 1350   Pulse 55 04/04/24 1500   Resp 16 04/04/24 1350   SpO2 99 % 04/04/24 1500   Vitals shown include unfiled device data.        Anesthesia Post Evaluation

## 2024-04-04 NOTE — ANESTHESIA PREPROCEDURE EVALUATION
Anesthesia Evaluation     Patient summary reviewed   NPO Solid Status: > 8 hours  NPO Liquid Status: > 2 hours           Airway   Mallampati: I  TM distance: >3 FB  Neck ROM: full  Dental          Pulmonary - normal exam   (+) asthma (rare rescue inhaler use, well controlled),  (-) COPD, sleep apnea, not a smoker  Cardiovascular - normal exam  Exercise tolerance: good (4-7 METS)    (-) hypertension, past MI, CAD, dysrhythmias, angina      Neuro/Psych  (-) seizures, TIA, CVA  GI/Hepatic/Renal/Endo    (+) obesity  (-) liver disease, no renal disease, diabetes, no thyroid disorder    Musculoskeletal     Abdominal    Substance History      OB/GYN          Other                          Anesthesia Plan    ASA 2     general     (Complications of general anesthesia include but not limited to awareness under anesthesia, nausea, vomiting, sore throat, hoarseness, chipped or cracked teeth, MI, CVA, or serious allergic reaction. )  intravenous induction     Anesthetic plan, risks, benefits, and alternatives have been provided, discussed and informed consent has been obtained with: patient.    Plan discussed with CRNA and attending.        CODE STATUS:

## 2024-04-04 NOTE — OP NOTE
Operative Note :  Shoshana Mendieta MD      Edwin Ingram  2005    Procedure Date: 04/04/24    Pre-op Diagnosis:  Pilonidal cyst [L05.91]    Post-Operative Diagnosis:  Post-Op Diagnosis Codes:     * Pilonidal cyst [L05.91]    Procedure:   Pilonidal cystectomy    Surgeon: Shoshana Mendieta MD    Assistant: Katarina Fitzgerald PA-C (she was responsible for suctioning, retracting, suturing of all surgical incisions, and application of sterile dressings at the completion of the case)    Anesthesia:  General (general endotracheal tube)    Estimated Blood Loss: minimal    Specimens: pilonidal cyst    Complications: none    Indications:  This is a 18-year-old male who presented with pilonidal abscess that required incision and drainage in the ER and repeat incision and drainage at the time of his office visit with me.  Given multiple drainage procedures required, recommended proceeding with excision of the pilonidal cyst.  All risks (bleeding, infection, wound , and the need to leave wound open for healing by secondary intention), benefits alternatives were discussed with the patient who verbalized understanding and elected to proceed.    Findings: Well-healed scar from prior I&D.  No ongoing purulent drainage.  Small, less than 1 mm opening more inferiorly along the gluteal cleft that may be a second pilonidal opening, which was over 10 cm away from problem area.    Description of procedure:  Patient was taken the operating room where he underwent general endotracheal anesthesia without complication.  He was placed in the prone jackknife position on the operating room table.  All bony prominences were padded.  The gluteal cleft was trimmed of hair and prepped and draped with Betadine.  A timeout was performed, confirming the patient, procedure and preoperative antibiotics.  Half percent Marcaine with epinephrine was injected circumferentially around the area.  Upon close inspection, I noted the above-mentioned  opening that was more inferior in the gluteal cleft.  Given that it was so far from the area of concern and showed no signs of inflammation and was not an obvious opening to a second pilonidal cyst, I elected to leave this alone.  I did inform his mother of this finding.    An elliptical incision was made around the area of previous abscess and deepened to the presacral fascia using Bovie electrocautery.  There was no purulence encountered.  The wound was irrigated and inspected for hemostasis.  Additional local was injected circumferentially for postoperative pain control.  A vessel loop was placed in the base of the wound and the wound was closed in multiple layers over this.  0 Vicryl was used in the deep dermal layers and 3-0 Vicryl was used to approximate the skin using vertical mattress sutures.  The vessel loop was tied in a loop to keep it in the wound.  The area was cleaned and a sterile dressing was applied.  The patient was then returned to his stretcher and awoken from anesthesia.  He was transferred to recovery in stable condition.  All sponge, needle and instrument counts were correct at the conclusion of the procedure.          ZACH NEWTON MD  General, Robotic, and Endoscopic Surgery  [facility]    [address], [address]  [place] [address]  P: [phone]  F: [phone]

## 2024-04-05 LAB
LAB AP CASE REPORT: NORMAL
PATH REPORT.FINAL DX SPEC: NORMAL
PATH REPORT.GROSS SPEC: NORMAL

## 2024-04-08 ENCOUNTER — TELEPHONE (OUTPATIENT)
Dept: SURGERY | Facility: CLINIC | Age: 19
End: 2024-04-08
Payer: COMMERCIAL

## 2024-04-08 NOTE — TELEPHONE ENCOUNTER
Pts mother called in asking if pt can take a stand up shower today?    If so, do they need to cover with a waterproof bandage?    Pls advise

## 2024-04-17 ENCOUNTER — OFFICE VISIT (OUTPATIENT)
Dept: SURGERY | Facility: CLINIC | Age: 19
End: 2024-04-17
Payer: COMMERCIAL

## 2024-04-17 VITALS
DIASTOLIC BLOOD PRESSURE: 74 MMHG | BODY MASS INDEX: 35.31 KG/M2 | HEIGHT: 73 IN | WEIGHT: 266.4 LBS | SYSTOLIC BLOOD PRESSURE: 116 MMHG

## 2024-04-17 DIAGNOSIS — L05.91 PILONIDAL CYST: Primary | ICD-10-CM

## 2024-04-17 PROCEDURE — 99024 POSTOP FOLLOW-UP VISIT: CPT | Performed by: SURGERY

## 2024-04-17 NOTE — PROGRESS NOTES
General Surgery Post-Operative Follow Up Note     History of Present Illness:    Edwin Ingram is a 18 y.o. year old male who presents for post-operative follow up from pilonidal cyst excision. He has been doing well. Having serous drainage from the vessel loop in the wound. Reports pain that is worse when moving.    Procedure:    Pilonidal cyst excision    Pathology:    1.  Pilonidal cyst, excision:               -Benign ruptured epidermal inclusion cyst.    Physical Exam:   Constitutional: Well-developed well-nourished, no acute distress  Eyes: Conjunctiva normal, sclera nonicteric  ENMT: Hearing grossly normal, oral mucosa moist  Respiratory: No increased work of breathing, normal inspiratory effort  Cardiovascular: Regular rate, no peripheral edema, no jugular venous distention  : incision c/d/I, serous drainage after vessel loop removed.  Skin:  Warm, dry, no rash on visualized skin surfaces  Musculoskeletal: Symmetric strength, normal gait  Psychiatric: Alert and oriented ×3, normal affect              Assessment/Plan:  Pilonidal cyst  - ok to start resuming more activity, like walking  - return to work after May 4th  - follow up with me if needed for any concerns      Shoshana Mendieta M.D.  General, Robotic and Endoscopic Surgery  [facility]    [address], [address]  [place], [place], [address]  P: [phone]  F: [phone]

## (undated) DEVICE — NDL HYPO PRECISIONGLIDE REG 25G 1 1/2

## (undated) DEVICE — ANTIBACTERIAL UNDYED BRAIDED (POLYGLACTIN 910), SYNTHETIC ABSORBABLE SUTURE: Brand: COATED VICRYL

## (undated) DEVICE — GLV SURG SENSICARE PI MIC PF SZ6.5 LF STRL

## (undated) DEVICE — PREP SOL POVIDONE/IODINE BT 4OZ

## (undated) DEVICE — PANTY KNIT MATERN L/XL

## (undated) DEVICE — PATIENT RETURN ELECTRODE, SINGLE-USE, CONTACT QUALITY MONITORING, ADULT, WITH 9FT CORD, FOR PATIENTS WEIGING OVER 33LBS. (15KG): Brand: MEGADYNE

## (undated) DEVICE — PAD,ABDOMINAL,8"X10",ST,LF: Brand: MEDLINE

## (undated) DEVICE — SUT ETHLN 3/0 PS1 18IN 1663H

## (undated) DEVICE — LOU MINOR PROCEDURE: Brand: MEDLINE INDUSTRIES, INC.

## (undated) DEVICE — SUT MNCRYL PLS ANTIB UD 4/0 PS2 18IN